# Patient Record
Sex: FEMALE | Race: OTHER | Employment: OTHER | URBAN - METROPOLITAN AREA
[De-identification: names, ages, dates, MRNs, and addresses within clinical notes are randomized per-mention and may not be internally consistent; named-entity substitution may affect disease eponyms.]

---

## 2017-01-18 DIAGNOSIS — G25.81 RLS (RESTLESS LEGS SYNDROME): ICD-10-CM

## 2017-01-18 RX ORDER — TRAZODONE HYDROCHLORIDE 50 MG/1
TABLET ORAL
Qty: 90 TABLET | Refills: 1 | Status: SHIPPED | OUTPATIENT
Start: 2017-01-18 | End: 2017-02-26 | Stop reason: SDUPTHER

## 2017-02-25 DIAGNOSIS — G25.81 RLS (RESTLESS LEGS SYNDROME): ICD-10-CM

## 2017-02-25 DIAGNOSIS — K21.9 GASTROESOPHAGEAL REFLUX DISEASE, ESOPHAGITIS PRESENCE NOT SPECIFIED: ICD-10-CM

## 2017-02-25 RX ORDER — OMEPRAZOLE 20 MG/1
20 CAPSULE, DELAYED RELEASE ORAL DAILY
Qty: 90 CAPSULE | Refills: 1 | Status: SHIPPED | OUTPATIENT
Start: 2017-02-25 | End: 2017-09-19 | Stop reason: SDUPTHER

## 2017-02-25 RX ORDER — GABAPENTIN 100 MG/1
100 CAPSULE ORAL NIGHTLY
Qty: 90 CAPSULE | Refills: 1 | Status: SHIPPED | OUTPATIENT
Start: 2017-02-25 | End: 2017-09-19 | Stop reason: SDUPTHER

## 2017-02-26 DIAGNOSIS — G25.81 RLS (RESTLESS LEGS SYNDROME): ICD-10-CM

## 2017-02-26 RX ORDER — TRAZODONE HYDROCHLORIDE 50 MG/1
50 TABLET ORAL NIGHTLY
Qty: 90 TABLET | Refills: 1 | Status: SHIPPED | OUTPATIENT
Start: 2017-02-26

## 2017-06-07 ENCOUNTER — OFFICE VISIT (OUTPATIENT)
Dept: FAMILY MEDICINE CLINIC | Age: 72
End: 2017-06-07

## 2017-06-07 VITALS
WEIGHT: 214 LBS | SYSTOLIC BLOOD PRESSURE: 136 MMHG | OXYGEN SATURATION: 96 % | TEMPERATURE: 96.6 F | HEIGHT: 66 IN | HEART RATE: 63 BPM | RESPIRATION RATE: 18 BRPM | DIASTOLIC BLOOD PRESSURE: 74 MMHG | BODY MASS INDEX: 34.39 KG/M2

## 2017-06-07 DIAGNOSIS — G25.81 RESTLESS LEG SYNDROME: ICD-10-CM

## 2017-06-07 DIAGNOSIS — Z23 NEED FOR TDAP VACCINATION: ICD-10-CM

## 2017-06-07 DIAGNOSIS — K21.9 GERD WITHOUT ESOPHAGITIS: ICD-10-CM

## 2017-06-07 DIAGNOSIS — L98.9 NON-HEALING SKIN LESION OF NOSE: ICD-10-CM

## 2017-06-07 DIAGNOSIS — M25.552 LEFT HIP PAIN: Primary | ICD-10-CM

## 2017-06-07 PROBLEM — E78.00 HYPERCHOLESTEROLEMIA: Status: ACTIVE | Noted: 2017-06-07

## 2017-06-07 RX ORDER — GABAPENTIN 100 MG/1
CAPSULE ORAL 3 TIMES DAILY
COMMUNITY

## 2017-06-07 RX ORDER — CLONAZEPAM 0.5 MG/1
TABLET ORAL
COMMUNITY

## 2017-06-07 RX ORDER — CYANOCOBALAMIN (VITAMIN B-12) 500 MCG
TABLET ORAL DAILY
COMMUNITY

## 2017-06-07 RX ORDER — TRAMADOL HYDROCHLORIDE 50 MG/1
50 TABLET ORAL
Qty: 20 TAB | Refills: 0 | Status: SHIPPED | OUTPATIENT
Start: 2017-06-07

## 2017-06-07 RX ORDER — OMEPRAZOLE 20 MG/1
20 CAPSULE, DELAYED RELEASE ORAL DAILY
COMMUNITY

## 2017-06-07 RX ORDER — TRAZODONE HYDROCHLORIDE 50 MG/1
TABLET ORAL
COMMUNITY

## 2017-06-07 NOTE — PROGRESS NOTES
Cary Ziegler is a 66 y.o.  female and presents with    Chief Complaint   Patient presents with    Establish Care     Subjective:  Hip Pain  Patient complains of left hip pain. Onset of the symptoms was several months ago. Inciting event: none. Current symptoms include left hip pain radiating into buttock and left knee. Severity = fairly severe. Associated symptoms: none. Aggravating symptoms: going up and down stairs. Patient's overall course: gradually worsening. Patient has had prior hip problems. Previous visits for this problem: yes, last seen 4 years ago by the patient's PCP. Evaluation to date: plain films: abnormal: mild arthritis. Treatment to date: ibuprofen 800 mg twice a day with minimal relief. She does not exercise. She has been diagnosed with restless leg syndrome and uses clonazepam and trazodone as needed for sleep. Patient Active Problem List   Diagnosis Code    Restless leg syndrome G25.81    Hypercholesterolemia E78.00     Patient Active Problem List    Diagnosis Date Noted    Restless leg syndrome 06/07/2017    Hypercholesterolemia 06/07/2017     Current Outpatient Prescriptions   Medication Sig Dispense Refill    clonazePAM (KLONOPIN) 0.5 mg tablet Take  by mouth nightly as needed.  omeprazole (PRILOSEC) 20 mg capsule Take 20 mg by mouth daily.  B.infantis-B.ani-B.long-B.bifi (PROBIOTIC 4X) 10-15 mg TbEC Take  by mouth.  traZODone (DESYREL) 50 mg tablet Take  by mouth nightly.  cholecalciferol (VITAMIN D3) 400 unit tab tablet Take  by mouth daily.  gabapentin (NEURONTIN) 100 mg capsule Take  by mouth three (3) times daily. Allergies   Allergen Reactions    Statins-Hmg-Coa Reductase Inhibitors Myalgia     Past Medical History:   Diagnosis Date    Hypercholesterolemia     Restless leg syndrome      Past Surgical History:   Procedure Laterality Date    HX TONSILLECTOMY  1951     History reviewed. No pertinent family history.   Social History   Substance Use Topics    Smoking status: Never Smoker    Smokeless tobacco: Not on file    Alcohol use Yes      Comment: ocassionally       ROS   General ROS: negative for - chills or fever  Ophthalmic ROS: positive for - uses glasses  ENT ROS: negative for - headaches  Endocrine ROS: negative for - polydipsia/polyuria or temperature intolerance  Respiratory ROS: no cough, shortness of breath, or wheezing  Cardiovascular ROS: no chest pain or dyspnea on exertion  Gastrointestinal ROS: no abdominal pain, change in bowel habits, or black or bloody stools  Genito-Urinary ROS: stress incontinence  Dermatological ROS: negative for - rash or skin lesion changes    All other systems reviewed and are negative. Objective:  Vitals:    06/07/17 1124   BP: 155/70   Pulse: 63   Resp: 18   Temp: 96.6 °F (35.9 °C)   TempSrc: Oral   SpO2: 96%   Weight: 214 lb (97.1 kg)   Height: 5' 6\" (1.676 m)   PainSc:   5   PainLoc: Hip       alert, well appearing, and in no distress, oriented to person, place, and time and obese  Mental status - normal mood, behavior, speech, dress, motor activity, and thought processes  Chest - clear to auscultation, no wheezes, rales or rhonchi, symmetric air entry  Heart - normal rate, regular rhythm, normal S1, S2, no murmurs, rubs, clicks or gallops  Neurological - cranial nerves II through XII intact, motor and sensory grossly normal bilaterally, normal muscle tone, no tremors, strength 5/5, normal gait and station  Musculoskeletal - abnormal exam of left hip with pain with motion external rotation  Extremities - peripheral pulses normal, no pedal edema, no clubbing or cyanosis  Skin - normal coloration and turgor, no rashes, no suspicious skin lesions noted    Assessment/Plan:    1. Left hip pain  Pain management and referral to physical therapy  - REFERRAL TO PHYSICAL THERAPY  - traMADol (ULTRAM) 50 mg tablet; Take 1 Tab by mouth every six (6) hours as needed for Pain.  Max Daily Amount: 200 mg. Dispense: 20 Tab; Refill: 0    2. Restless leg syndrome  Continue current medications which have been effective    3. Need for Tdap vaccination    - TETANUS, DIPHTHERIA TOXOIDS AND ACELLULAR PERTUSSIS VACCINE (TDAP), IN INDIVIDS. >=7, IM    4. Non-healing skin lesion of nose  Concern for malignant neoplasm  - REFERRAL TO DERMATOLOGY    5. GERD without esophagitis  Continue PPI;       I have discussed the diagnosis with the patient and the intended plan as seen in the above orders. The patient has received an after-visit summary and questions were answered concerning future plans. I have discussed medication side effects and warnings with the patient as well. I have reviewed the plan of care with the patient, accepted their input and they are in agreement with the treatment goals. Follow-up Disposition:  Return in about 5 weeks (around 7/12/2017) for hip pain.

## 2017-06-07 NOTE — MR AVS SNAPSHOT
Visit Information Date & Time Provider Department Dept. Phone Encounter #  
 6/7/2017 10:45 AM Salas Trevizo, [facility] [phone] [id number] Follow-up Instructions Return in about 5 weeks (around 7/12/2017) for hip pain. Upcoming Health Maintenance Date Due DTaP/Tdap/Td series (1 - Tdap) 8/17/1965 BREAST CANCER SCRN MAMMOGRAM 8/17/1994 FOBT Q 1 YEAR AGE 50-75 8/17/1994 ZOSTER VACCINE AGE 60> 8/17/2004 GLAUCOMA SCREENING Q2Y 8/17/2009 OSTEOPOROSIS SCREENING (DEXA) 8/17/2009 Pneumococcal 65+ Low/Medium Risk (1 of 2 - PCV13) 8/17/2009 MEDICARE YEARLY EXAM 8/17/2009 INFLUENZA AGE 9 TO ADULT 8/1/2017 Allergies as of 6/7/2017  Review Complete On: 6/7/2017 By: Salas Trevizo MD  
  
 Severity Noted Reaction Type Reactions Statins-hmg-coa Reductase Inhibitors  06/07/2017    Myalgia Current Immunizations  Never Reviewed Name Date Tdap 6/7/2017 Not reviewed this visit You Were Diagnosed With   
  
 Codes Comments Left hip pain    -  Primary ICD-10-CM: G94.769 ICD-9-CM: 719.45 Restless leg syndrome     ICD-10-CM: G25.81 ICD-9-CM: 333.94 Need for Tdap vaccination     ICD-10-CM: S61 ICD-9-CM: V06.1 Non-healing skin lesion of nose     ICD-10-CM: L98.9 ICD-9-CM: 709.9 GERD without esophagitis     ICD-10-CM: K21.9 ICD-9-CM: 530.81 Vitals BP Pulse Temp Resp Height(growth percentile) Weight(growth percentile) 136/74 (BP 1 Location: Right arm, BP Patient Position: Sitting) 63 96.6 °F (35.9 °C) (Oral) 18 5' 6\" (1.676 m) 214 lb (97.1 kg) SpO2 BMI Smoking Status 96% 34.54 kg/m2 Never Smoker Vitals History BMI and BSA Data Body Mass Index Body Surface Area 34.54 kg/m 2 2.13 m 2 Your Updated Medication List  
  
   
This list is accurate as of: 6/7/17 12:12 PM.  Always use your most recent med list.  
  
  
  
  
 cholecalciferol 400 unit Tab tablet Commonly known as:  VITAMIN D3 Take  by mouth daily. clonazePAM 0.5 mg tablet Commonly known as:  Jeremy Jenny Take  by mouth nightly as needed. gabapentin 100 mg capsule Commonly known as:  NEURONTIN Take  by mouth three (3) times daily. omeprazole 20 mg capsule Commonly known as:  PRILOSEC Take 20 mg by mouth daily. PROBIOTIC 4X 10-15 mg Tbec Generic drug:  B.infantis-B.ani-B.long-B.bifi Take  by mouth. traMADol 50 mg tablet Commonly known as:  ULTRAM  
Take 1 Tab by mouth every six (6) hours as needed for Pain. Max Daily Amount: 200 mg.  
  
 traZODone 50 mg tablet Commonly known as:  Luellen Loft Take  by mouth nightly. Prescriptions Printed Refills  
 traMADol (ULTRAM) 50 mg tablet 0 Sig: Take 1 Tab by mouth every six (6) hours as needed for Pain. Max Daily Amount: 200 mg. Class: Print Route: Oral  
  
We Performed the Following REFERRAL TO DERMATOLOGY [REF19 Custom] Comments:  
 Please evaluate 65 y/o female patient for non healing lesion on nose. REFERRAL TO PHYSICAL THERAPY [LVE16 Custom] Comments:  
 Please evaluate 65 y/o female patient for left hip pain. TETANUS, DIPHTHERIA TOXOIDS AND ACELLULAR PERTUSSIS VACCINE (TDAP), IN INDIVIDS. >=7, IM [id number] CPT(R)] Follow-up Instructions Return in about 5 weeks (around 7/12/2017) for hip pain. Referral Information Referral ID Referred By Referred To  
  
 [id number] ROBYN [address] [place], [address] [place] [address] Road Phone: [phone] Fax: [phone] Visits Status Start Date End Date 1 New Request 6/7/17 6/7/18 If your referral has a status of pending review or denied, additional information will be sent to support the outcome of this decision. Referral ID Referred By Referred To  
 [id number] LIZZETTE CARLSON IN [facility]. [address] [place] [address] [address] [place], [address] [facility] Phone: [phone] Visits Status Start Date End Date 1 New Request 6/7/17 6/7/18 If your referral has a status of pending review or denied, additional information will be sent to support the outcome of this decision. Introducing \[facility] [place] [facility]\"" & HEALTH SERVICES! Claribel Cheung introduces [employer] patient portal. Now you can access parts of your medical record, email your doctor's office, and request medication refills online. 1. In your internet browser, go to [email or web address] 2. Click on the First Time User? Click Here link in the Sign In box. You will see the New Member Sign Up page. 3. Enter your [employer] Access Code exactly as it appears below. You will not need to use this code after youve completed the sign-up process. If you do not sign up before the expiration date, you must request a new code. · [employer] Access Code: [email or web address] Expires: 9/5/2017 11:11 AM 
 
4. Enter the last four digits of your Social Security Number (xxxx) and Date of Birth (mm/dd/yyyy) as indicated and click Submit. You will be taken to the next sign-up page. 5. Create a [employer] ID. This will be your [employer] login ID and cannot be changed, so think of one that is secure and easy to remember. 6. Create a [employer] password. You can change your password at any time. 7. Enter your Password Reset Question and Answer. This can be used at a later time if you forget your password. 8. Enter your e-mail address. You will receive e-mail notification when new information is available in [address] 9. Click Sign Up. You can now view and download portions of your medical record. 10. Click the Download Summary menu link to download a portable copy of your medical information. If you have questions, please visit the Frequently Asked Questions section of the [employer] website. Remember, [employer] is NOT to be used for urgent needs. For medical emergencies, dial 911. Now available from your iPhone and Android! Please provide this summary of care documentation to your next provider. If you have any questions after today's visit, please call [phone].

## 2017-06-07 NOTE — PROGRESS NOTES
1. Have you been to the ER, urgent care clinic since your last visit? Hospitalized since your last visit? NO    2. Have you seen or consulted any other health care providers outside of the [facility] since your last visit? Include any pap smears or colon screening.  NO

## 2017-06-27 ENCOUNTER — HOSPITAL ENCOUNTER (OUTPATIENT)
Dept: PHYSICAL THERAPY | Age: 72
Discharge: HOME | End: 2017-06-27
Payer: MEDICARE

## 2017-06-27 PROCEDURE — 97161 PT EVAL LOW COMPLEX 20 MIN: CPT | Performed by: PHYSICAL THERAPIST

## 2017-06-27 PROCEDURE — G8978 MOBILITY CURRENT STATUS: HCPCS | Performed by: PHYSICAL THERAPIST

## 2017-06-27 PROCEDURE — G8979 MOBILITY GOAL STATUS: HCPCS | Performed by: PHYSICAL THERAPIST

## 2017-06-27 PROCEDURE — 97110 THERAPEUTIC EXERCISES: CPT | Performed by: PHYSICAL THERAPIST

## 2017-06-27 NOTE — PROGRESS NOTES
[facility] PH[facility] - DAILY TREATMENT NOTE    Patient Name: Nidia Hendrix        Date: 2017  : 1945   yes Patient  Verified  Visit #:     Insurance: Payor: Rena Houston / Plan: [place] MEDICARE PART A & B / Product Type: Medicare /      In time: 12:25 Out time: 1:05   Total Treatment Time: 40     Medicare Time Tracking (below)   Total Timed Codes (min):  10 1:1 Treatment Time:  10     TREATMENT AREA =  Left hip pain [M25.552]    SUBJECTIVE  Pain Level (on 0 to 10 scale):  ie  / 10   Medication Changes/New allergies or changes in medical history, any new surgeries or procedures?    no  If yes, update Summary List   Subjective Functional Status/Changes:  []  No changes reported     See ie          OBJECTIVE      10 min Therapeutic Exercise:  [x]  See flow sheet   Rationale:      increase ROM and increase strength to improve the patients ability to complete adls            min Patient Education:  yes  Reviewed HEP   []  Progressed/Changed HEP based on: Other Objective/Functional Measures:    Demo hep without any increase in pain  See ie     Post Treatment Pain Level (on 0 to 10) scale:   ie  / 10     ASSESSMENT  Assessment/Changes in Function:     See ie     []  See Progress Note/Recertification   Patient will continue to benefit from skilled PT services to modify and progress therapeutic interventions, address functional mobility deficits, address ROM deficits, address strength deficits, analyze and address soft tissue restrictions, analyze and cue movement patterns, analyze and modify body mechanics/ergonomics, assess and modify postural abnormalities and instruct in home and community integration to attain remaining goals.    Progress toward goals / Updated goals:    See ie     PLAN  []  Upgrade activities as tolerated yes Continue plan of care   []  Discharge due to :    []  Other:      Therapist: Farida Diaz PT    Date: 2017 Time: 1:13 PM     Future Appointments  Date Time Provider Patricia Barrera   6/28/2017 8:00 AM Maricarmen Skains, PT [facility]   7/3/2017 11:00 AM Maricarmen Skains, PT [facility]   7/5/2017 9:00 AM Maricarmen Skains, PT [facility]   7/7/2017 8:30 AM Maricarmen Skains, PT [facility]   7/10/2017 2:30 PM Maricarmen Skains, PT [facility]   7/12/2017 10:00 AM Maricarmen Skains, PT [facility]   7/14/2017 10:30 AM Maricarmen Skains, PT [facility]   7/17/2017 8:30 AM Maricarmen Skains, PT [facility]   7/19/2017 1:00 PM Maricarmen Skains, PT [facility]   7/21/2017 11:00 AM Maricarmen Skains, PT [facility]   7/24/2017 10:30 AM Maricarmen Skains, PT [facility]   7/26/2017 10:30 AM Maricarmen Skains, PT [facility]   7/28/2017 10:30 AM Maricarmen Skains, PT [facility]

## 2017-06-27 NOTE — PROGRESS NOTES
[facility] PHYSICAL [facility]ERAPY   [place] [address],[facility], [address] - Phone: [phone]  Fax: [phone] / [address] [facility]  Patient Name: Isaías Rojas :    Medical   Diagnosis: L hip pain  Treatment Diagnosis: Left hip pain [M25.552]   Onset Date: chronic     Referral Source: Reinaldo Larose MD Start of Care [facility]): 2017   Prior Hospitalization: See medical history Provider #: [id number]   Prior Level of Function: Previous low back pain limiting lifting/bending   Comorbidities: LBP   Medications: Verified on Patient Summary List   The Plan of Care and following information is based on the information from the initial evaluation.   ===========================================================================================  Assessment / key information:  66 F arrives to clinic c/o L posterior lateral pain on insidious onset. Pain 2-9/10 based on activity level (increased with standing/stairs) and is alleviated with rest. Denies red flags, formal imaging or previous treatment. Objective findings: ls arom wnl all directions but aberrant movement return from flexion, -slump, slr, L hip arom flex 88, er 4, ir neutral, ext 8, abd 14, strength reduced in all directions with er/flex P!, +star, faddir, terese test and prone er. ttp throughout glute med, piriformis and iliopsoas.  Will attempt PT to address problem list below in order to restore pt overall function.   ===========================================================================================  Eval Complexity: History MEDIUM  Complexity : 1-2 comorbidities / personal factors will impact the outcome/ POC ;  Examination  MEDIUM Complexity : 3 Standardized tests and measures addressing body structure, function, activity limitation and / or participation in recreation ; Presentation LOW Complexity : Stable, uncomplicated ;  Decision Making MEDIUM Complexity : FOTO score of 26-74; Overall Complexity LOW   Problem List: pain affecting function, decrease ROM, decrease strength, impaired gait/ balance, decrease ADL/ functional abilitiies, decrease activity tolerance, decrease flexibility/ joint mobility and decrease transfer abilities   Treatment Plan may include any combination of the following: Therapeutic exercise, Therapeutic activities, Neuromuscular re-education, Physical agent/modality, Gait/balance training, Manual therapy, Patient education, Self Care training, Functional mobility training, Home safety training and Stair training  Patient / Family readiness to learn indicated by: asking questions, trying to perform skills and interest  Persons(s) to be included in education: patient (P)  Barriers to Learning/Limitations: None  Measures taken:    Patient Goal (s): Decrease pain   Patient self reported health status: good  Rehabilitation Potential: good   Short Term Goals: To be accomplished in  2  weeks:  1. Pt will be compliant with hep  2. Pt will note daily max pain </=7/10 in order to increase participation in [address]: To be accomplished in  4  weeks:  1. Pt will increase FOTO by 30 points in order to show functional improvement  2. Pt will increase L hip arom wnl all directions to A with adls  3. Pt will note daily max pain </=3/10 in order to increase participation in adls  Frequency / Duration:   Patient to be seen  2-3  times per week for 4  weeks:  Patient / Caregiver education and instruction: self care, activity modification and exercises  G-Codes (GP): Jose Ramon Zapien .Mobility [id number] Current  CL= 60-79%   Goal  CK= 40-59%.   The severity rating is based on the FOTO Score    Therapist Signature: Gage Cantor DPT [facility] Date: 0/46/0109   Certification Period: 6/27/17-9/25/17 Time: 1:14 PM   ===========================================================================================  I certify that the above Physical Therapy Services are being furnished while the patient is under my care. I agree with the treatment plan and certify that this therapy is necessary. Physician Signature:        Date:       Time:     Please sign and return to [facility]. or you may fax the signed copy to [phone]. Thank you.

## 2017-06-28 ENCOUNTER — HOSPITAL ENCOUNTER (OUTPATIENT)
Dept: PHYSICAL THERAPY | Age: 72
Discharge: HOME | End: 2017-06-28
Payer: MEDICARE

## 2017-06-28 PROCEDURE — 97110 THERAPEUTIC EXERCISES: CPT | Performed by: PHYSICAL THERAPIST

## 2017-06-28 PROCEDURE — 97140 MANUAL THERAPY 1/> REGIONS: CPT | Performed by: PHYSICAL THERAPIST

## 2017-06-28 NOTE — PROGRESS NOTES
[facility] PHYSICAL THERAP[facility] - DAILY TREATMENT NOTE    Patient Name: Sade Reid        Date: 2017  : 1945   yes Patient  Verified  Visit #:     Insurance: Payor: Mary Kate Torre / Plan: [place] MEDICARE PART A & B / Product Type: Medicare /      In time: 7:55 Out time: 8:55   Total Treatment Time: 60     Medicare Time Tracking (below)   Total Timed Codes (min):  50 1:1 Treatment Time:  50     TREATMENT AREA =  Left hip pain [M25.552]    SUBJECTIVE  Pain Level (on 0 to 10 scale):  4  / 10   Medication Changes/New allergies or changes in medical history, any new surgeries or procedures?    no  If yes, update Summary List   Subjective Functional Status/Changes:  []  No changes reported     No change since yesterday evaluation - I did the exercises one more time and they felt pretty good            OBJECTIVE  Modalities Rationale:     decrease inflammation, decrease pain and increase tissue extensibility to improve patient's ability to complete adls   min [] Estim, type/location:                                      []  att     []  unatt     []  w/US     []  w/ice    []  w/heat    min []  Mechanical Traction: type/lbs                   []  pro   []  sup   []  int   []  cont    []  before manual    []  after manual    min []  Ultrasound, settings/location:      min []  Iontophoresis w/ dexamethasone, location:                                               []  take home patch       []  in clinic   10 min [x]  Ice     []  Heat    location/position:  To left hip in R sidelying     min []  Vasopneumatic Device, press/temp:     min []  Other:    [x] Skin assessment post-treatment (if applicable):    [x]  intact    []  redness- no adverse reaction     []redness  adverse reaction:        30 min Therapeutic Exercise:  [x]  See flow sheet   Rationale:      increase ROM and increase strength to improve the patients ability to complete adls     20 min Manual Therapy: Sacral flexion, rr mobs, mfr lumbosacral junction, L sacral border, L hip ir/er stretch, iliacus and rf stretch   Rationale:      decrease pain, increase ROM, increase tissue extensibility and decrease trigger points to improve patient's ability to complete adls         min Patient Education:  yes  Reviewed HEP   []  Progressed/Changed HEP based on: Other Objective/Functional Measures:  Decreased hip/pelvis disassociation during hip prom with increase ls motion to achieve range  Complete te as per flow sheet without c/o      Post Treatment Pain Level (on 0 to 10) scale:   0  / 10     ASSESSMENT  Assessment/Changes in Function:     Complete resolution of pain at end of session     []  See Progress Note/Recertification   Patient will continue to benefit from skilled PT services to modify and progress therapeutic interventions, address functional mobility deficits, address ROM deficits, address strength deficits, analyze and address soft tissue restrictions, analyze and cue movement patterns, analyze and modify body mechanics/ergonomics, assess and modify postural abnormalities and instruct in home and community integration to attain remaining goals.    Progress toward goals / Updated goals:    Compliant with initial hep     PLAN  []  Upgrade activities as tolerated yes Continue plan of care   []  Discharge due to :    []  Other:      Therapist: Yael Farley PT    Date: 6/28/2017 Time: 7:58 AM     Future Appointments  Date Time Provider Patricia Barrera   6/28/2017 8:00 AM Yael Farley PT [facility]   7/3/2017 11:00 AM Yale Farley PT [facility]   7/5/2017 9:00 AM Yael Farley PT [facility]   7/7/2017 8:30 AM Yael Farley PT [facility]   7/10/2017 2:30 PM Yael Farley PT [facility]   7/12/2017 10:00 AM Yael Farley PT [facility]   7/14/2017 10:30 AM Yael Farley PT [facility]   7/17/2017 8:30 AM Yael Farley PT [facility]   7/19/2017 1:00 PM Yael Farley PT [facility]   7/21/2017 11:00 AM Yael Farley PT [facility]   7/24/2017 10:30 AM Kt Wooten, PT [facility]   7/26/2017 10:30 AM tK Wooten PT [facility]   7/28/2017 10:30 AM Kt Wooten, PT [facility] [facility]

## 2017-07-03 ENCOUNTER — APPOINTMENT (OUTPATIENT)
Dept: PHYSICAL THERAPY | Age: 72
End: 2017-07-03
Payer: MEDICARE

## 2017-07-05 ENCOUNTER — HOSPITAL ENCOUNTER (OUTPATIENT)
Dept: PHYSICAL THERAPY | Age: 72
Discharge: HOME | End: 2017-07-05
Payer: MEDICARE

## 2017-07-05 PROCEDURE — 97140 MANUAL THERAPY 1/> REGIONS: CPT | Performed by: PHYSICAL THERAPIST

## 2017-07-05 PROCEDURE — 97110 THERAPEUTIC EXERCISES: CPT | Performed by: PHYSICAL THERAPIST

## 2017-07-05 NOTE — PROGRESS NOTES
[facility] PHYSICAL THERAPY - DAILY TREATMENT NOTE    Patient Name: Travis Morales        Date: 2017  : 1945   yes Patient  Verified  Visit #:   3   of   12  Insurance: Payor: Milka Maddox / Plan: [place] MED[place] A & B / Product Type: Medicare /      In time: 8:50 Out time: 9:48   Total Treatment Time: 54     Medicare Time Tracking (below)   Total Timed Codes (min):  45 1:1 Treatment Time:  45     TREATMENT AREA =  Left hip pain [M25.552]    SUBJECTIVE  Pain Level (on 0 to 10 scale):  0/ 10   Medication Changes/New allergies or changes in medical history, any new surgeries or procedures?    no  If yes, update Summary List   Subjective Functional Status/Changes:  []  No changes reported     I feel so much better than last time. Over the weekend I was able to stand and do dishes and get in/out of car without any increase in pain        OBJECTIVE  Modalities Rationale:     decrease inflammation, decrease pain and increase tissue extensibility to improve patient's ability to complete adls   min [] Estim, type/location:                                      []  att     []  unatt     []  w/US     []  w/ice    []  w/heat    min []  Mechanical Traction: type/lbs                   []  pro   []  sup   []  int   []  cont    []  before manual    []  after manual    min []  Ultrasound, settings/location:      min []  Iontophoresis w/ dexamethasone, location:                                               []  take home patch       []  in clinic   10 min [x]  Ice     []  Heat    location/position:  To left hip in R sidelying     min []  Vasopneumatic Device, press/temp:     min []  Other:    [x] Skin assessment post-treatment (if applicable):    [x]  intact    []  redness- no adverse reaction     []redness  adverse reaction:        30 min Therapeutic Exercise:  [x]  See flow sheet   Rationale:      increase ROM and increase strength to improve the patients ability to complete adls     15 min Manual Therapy: Sacral flexion, rr mobs, mfr lumbosacral junction, L sacral border, L hip ir/er stretch, iliacus and rf stretch   Rationale:      decrease pain, increase ROM, increase tissue extensibility and decrease trigger points to improve patient's ability to complete adls         min Patient Education:  yes  Reviewed HEP   []  Progressed/Changed HEP based on: Other Objective/Functional Measures:  Significant reduction in piriformis and GM tightness compared to previous session  Improved form with te with decreased ls rotation compensation      Post Treatment Pain Level (on 0 to 10) scale:   0  / 10     ASSESSMENT  Assessment/Changes in Function:     Increased participation in adls including standing/walking for longer duration since last session      []  See Progress Note/Recertification   Patient will continue to benefit from skilled PT services to modify and progress therapeutic interventions, address functional mobility deficits, address ROM deficits, address strength deficits, analyze and address soft tissue restrictions, analyze and cue movement patterns, analyze and modify body mechanics/ergonomics, assess and modify postural abnormalities and instruct in home and community integration to attain remaining goals.    Progress toward goals / Updated goals:    Steady progress towards pain reduction     PLAN  []  Upgrade activities as tolerated yes Continue plan of care   []  Discharge due to :    []  Other:      Therapist: Mau Mccormack PT    Date: 7/5/2017 Time: 7:58 AM     Future Appointments  Date Time Provider Patricia Barrera   7/5/2017 9:00 AM Mau Mccormack PT [facility]   7/7/2017 8:30 AM Mau Mccormack, PT [facility]   7/10/2017 2:30 PM Mau Mccormack PT [facility]   7/12/2017 10:00 AM Mau Mccormack PT [facility]   7/14/2017 10:30 AM Mau Mccormack, PT [facility]   7/17/2017 8:30 AM Mau Mccormack PT [facility]   7/19/2017 1:00 PM Mau Mccormack PT [facility]   7/21/2017 11:00 AM Mau Mccormack, PT [facility] 7/24/2017 10:30 AM Kt Wooten, PT [facility]   7/26/2017 10:30 AM [facility]   7/28/2017 10:30 AM Kt Wooten, PT [address] [facility]

## 2017-07-07 ENCOUNTER — HOSPITAL ENCOUNTER (OUTPATIENT)
Dept: PHYSICAL THERAPY | Age: 72
Discharge: HOME | End: 2017-07-07
Payer: MEDICARE

## 2017-07-07 PROCEDURE — 97110 THERAPEUTIC EXERCISES: CPT | Performed by: PHYSICAL THERAPIST

## 2017-07-07 PROCEDURE — 97140 MANUAL THERAPY 1/> REGIONS: CPT | Performed by: PHYSICAL THERAPIST

## 2017-07-07 NOTE — PROGRESS NOTES
[facility] PHYSICAL THERAPY - DAILY TREATMENT NOTE    Patient Name: Sissy Coyne        Date: 2017  : 1945   yes Patient  Verified  Visit #:     Insurance: Payor: Madeline Lloyd / Plan: [place] MEDICARE PART A & B / Product Type: Medicare /      In time: 8:24 Out time: 9:25   Total Treatment Time: 60     Medicare Time Tracking (below)   Total Timed Codes (min):  1:1 Treatment Time:      TREATMENT AREA =  Left hip pain [M25.552]    SUBJECTIVE  Pain Level (on 0 to 10 scale):  2 / 10   Medication Changes/New allergies or changes in medical history, any new surgeries or procedures?    no  If yes, update Summary List   Subjective Functional Status/Changes:  []  No changes reported     A little sore after last session but I really feel like it is just the muscle I have not used for a long time. It is not real pain or anything. OBJECTIVE  Modalities Rationale:     decrease inflammation, decrease pain and increase tissue extensibility to improve patient's ability to complete adls   min [] Estim, type/location:                                      []  att     []  unatt     []  w/US     []  w/ice    []  w/heat    min []  Mechanical Traction: type/lbs                   []  pro   []  sup   []  int   []  cont    []  before manual    []  after manual    min []  Ultrasound, settings/location:      min []  Iontophoresis w/ dexamethasone, location:                                               []  take home patch       []  in clinic   10 min [x]  Ice     []  Heat    location/position:  To left hip in R sidelying     min []  Vasopneumatic Device, press/temp:     min []  Other:    [x] Skin assessment post-treatment (if applicable):    [x]  intact    []  redness- no adverse reaction     []redness  adverse reaction:        35 min Therapeutic Exercise:  [x]  See flow sheet   Rationale:      increase ROM and increase strength to improve the patients ability to complete adls     15 min Manual Therapy: Sacral flexion, rr mobs, mfr lumbosacral junction, L sacral border, L hip ir/er stretch, iliacus and rf stretch   Rationale:      decrease pain, increase ROM, increase tissue extensibility and decrease trigger points to improve patient's ability to complete adls         min Patient Education:  yes  Reviewed HEP   []  Progressed/Changed HEP based on: Other Objective/Functional Measures:  Resumed te as per flow sheet (pt agreed to progression and expressed understanding of DOMS). Able to complete increased reps without c/o pain    Post Treatment Pain Level (on 0 to 10) scale:   0  / 10     ASSESSMENT  Assessment/Changes in Function:     Pt reports ability to walk in/out of clinic without any increase in L buttock pain      []  See Progress Note/Recertification   Patient will continue to benefit from skilled PT services to modify and progress therapeutic interventions, address functional mobility deficits, address ROM deficits, address strength deficits, analyze and address soft tissue restrictions, analyze and cue movement patterns, analyze and modify body mechanics/ergonomics, assess and modify postural abnormalities and instruct in home and community integration to attain remaining goals.    Progress toward goals / Updated goals:    Steady progress towards pain reduction - increased hep will again assess compliance - stg #2 achieved     PLAN  []  Upgrade activities as tolerated yes Continue plan of care   []  Discharge due to :    []  Other:      Therapist: Cordell Lerner PT    Date: 7/7/2017 Time: 7:58 AM     Future Appointments  Date Time Provider Patricia Barrera   7/7/2017 8:30 AM Cordell Lerner PT [facility]   7/10/2017 2:30 PM Cordell Lerner PT [facility]   7/12/2017 10:00 AM Cordell Lerner PT [facility]   7/14/2017 10:30 AM Cordell Lerner PT [facility]   7/17/2017 8:30 AM Cordell Lerner PT [facility]   7/19/2017 1:00 PM Cordell Lerner PT [facility]   7/21/2017 11:00 AM Cordell Lerner, PT [facility] 7/24/2017 10:30 AM Gail Villafana, PT [address]   7/26/2017 10:30 AM Gail Villafana, IM[facility] [place] [address]   7/28/2017 10:30 AM Gail Villafana, PT [address]

## 2017-07-10 ENCOUNTER — HOSPITAL ENCOUNTER (OUTPATIENT)
Dept: PHYSICAL THERAPY | Age: 72
Discharge: HOME | End: 2017-07-10
Payer: MEDICARE

## 2017-07-10 PROCEDURE — 97140 MANUAL THERAPY 1/> REGIONS: CPT | Performed by: PHYSICAL THERAPIST

## 2017-07-10 PROCEDURE — 97110 THERAPEUTIC EXERCISES: CPT | Performed by: PHYSICAL THERAPIST

## 2017-07-10 NOTE — PROGRESS NOTES
[facility] PHYSICAL THERAPY - DAILY TREATMENT NOTE    Patient Name: Haydee Haskins        Date: 7/10/2017  : 1945   yes Patient  Verified  Visit #:     Insurance: Payor: Budd Pore / Plan: [place] MEDICARE PART A & B / Product Type: Medicare /      In time: 2:30 Out time: 3:30   Total Treatment Time: 55     Medicare Time Tracking (below)   Total Timed Codes (min): 55 1:1 Treatment Time: 25     TREATMENT AREA =  Left hip pain [M25.552]    SUBJECTIVE  Pain Level (on 0 to 10 scale):  0 / 10   Medication Changes/New allergies or changes in medical history, any new surgeries or procedures?    no  If yes, update Summary List   Subjective Functional Status/Changes:  []  No changes reported     I have had 0/10 pain since last session. I am feeling really good in fact the only thing that was holding me up over the weekend was my knees       OBJECTIVE      40 min Therapeutic Exercise:  [x]  See flow sheet   Rationale:      increase ROM and increase strength to improve the patients ability to complete adls     15 min Manual Therapy: Sacral flexion, rr mobs, mfr lumbosacral junction, L sacral border, L hip ir/er stretch, iliacus and rf stretch   Rationale:      decrease pain, increase ROM, increase tissue extensibility and decrease trigger points to improve patient's ability to complete adls         min Patient Education:  yes  Reviewed HEP   []  Progressed/Changed HEP based on:        Other Objective/Functional Measures:  1:1 230-255  Significant reduction in soft tissue restrictions in L gluteal region  Progressed te as per flow sheet - denied pain at end so held on ice   Post Treatment Pain Level (on 0 to 10) scale:   0  / 10     ASSESSMENT  Assessment/Changes in Function:   Reports able to ambulate on uneven surface (grass) and negotiate stairs without any hip or back pain       []  See Progress Note/Recertification   Patient will continue to benefit from skilled PT services to modify and progress therapeutic interventions, address functional mobility deficits, address ROM deficits, address strength deficits, analyze and address soft tissue restrictions, analyze and cue movement patterns, analyze and modify body mechanics/ergonomics, assess and modify postural abnormalities and instruct in home and community integration to attain remaining goals. Progress toward goals / Updated goals:     Will perform FOTO next session In order to assess progress towards goal     PLAN  []  Upgrade activities as tolerated yes Continue plan of care   []  Discharge due to :    []  Other:      Therapist: Yael Farley PT    Date: 7/10/2017 Time: 7:58 AM     Future Appointments  Date Time Provider Patricia Barrera   7/10/2017 2:30 PM Yale Farley PT [facility]   7/12/2017 10:00 AM Yael Farley PT [facility]   7/14/2017 10:30 AM Yael Farley PT [facility]   7/17/2017 8:30 AM Yael Farley PT [facility]   7/19/2017 1:00 PM Yael Farley PT [facility]   7/21/2017 11:00 AM Yael Farley PT [facility]   7/24/2017 10:30 AM Yael Farley PT [facility]   7/26/2017 10:30 AM Yael Farley PT [facility]   7/28/2017 10:30 AM Yael Farley, PT [facility]

## 2017-07-12 ENCOUNTER — HOSPITAL ENCOUNTER (OUTPATIENT)
Dept: PHYSICAL THERAPY | Age: 72
Discharge: HOME | End: 2017-07-12
Payer: MEDICARE

## 2017-07-12 PROCEDURE — 97140 MANUAL THERAPY 1/> REGIONS: CPT | Performed by: PHYSICAL THERAPIST

## 2017-07-12 NOTE — PROGRESS NOTES
[facility] PHYSICAL THERAPY - DAILY TREATMENT NOTE    Patient Name: Jet Mittal        Date: 2017  : 1945   yes Patient  Verified  Visit #:     Insurance: Payor: Renny Frankel / Plan: [place] MEDICARE PART A & B / Product Type: Medicare /      In time: 10:35 Out time: 11:45   Total Treatment Time: 70     Medicare Time Tracking (below)   Total Timed Codes (min): 60 1:1 Treatment Time: 20     TREATMENT AREA =  Left hip pain [M25.552]    SUBJECTIVE  Pain Level (on 0 to 10 scale):  3 / 10   Medication Changes/New allergies or changes in medical history, any new surgeries or procedures?    no  If yes, update Summary List   Subjective Functional Status/Changes:  []  No changes reported     I was sore in my low back after the last session.  It was hard for me to sleep because of it       OBJECTIVE  Modalities Rationale:     decrease inflammation, decrease pain and increase tissue extensibility to improve patient's ability to complete adls                min [] Estim, type/location:                                       []  att     []  unatt     []  w/US     []  w/ice    []  w/heat     min []  Mechanical Traction: type/lbs                    []  pro   []  sup   []  int   []  cont    []  before manual    []  after manual     min []  Ultrasound, settings/location:        min []  Iontophoresis w/ dexamethasone, location:                                                []  take home patch       []  in clinic   10 min [x]  Ice     []  Heat    location/position: Prone with pillow under hips      min []  Vasopneumatic Device, press/temp:       min []  Other:     [x] Skin assessment post-treatment (if applicable):    [x]  intact    []  redness- no adverse reaction     []redness  adverse reaction:          40 min Therapeutic Exercise:  [x]  See flow sheet   Rationale:      increase ROM and increase strength to improve the patients ability to complete adls     20 min Manual Therapy: Sacral flexion, rr mobs, mfr lumbosacral junction, L sacral border, L hip ir/er stretch, iliacus and rf stretch, stm ls paraspinals, glutes, piri   Rationale:      decrease pain, increase ROM, increase tissue extensibility and decrease trigger points to improve patient's ability to complete adls         min Patient Education:  yes  Reviewed HEP   []  Progressed/Changed HEP based on: Other Objective/Functional Measures:  1:1 mt only - increased paraspinal tightness as well as increased pain along R sacral border  Returned to 1/2 prone ext and able to complete without any increase in pain    Post Treatment Pain Level (on 0 to 10) scale:   0  / 10     ASSESSMENT  Assessment/Changes in Function:     Noted reduction in sx at end of the session. []  See Progress Note/Recertification   Patient will continue to benefit from skilled PT services to modify and progress therapeutic interventions, address functional mobility deficits, address ROM deficits, address strength deficits, analyze and address soft tissue restrictions, analyze and cue movement patterns, analyze and modify body mechanics/ergonomics, assess and modify postural abnormalities and instruct in home and community integration to attain remaining goals. Progress toward goals / Updated goals:    Held on functional goals due to increase in pain reported from previous session and od not feel like it is an accurate assessment of current function. Will attempt next time.       PLAN  []  Upgrade activities as tolerated yes Continue plan of care   []  Discharge due to :    []  Other:      Therapist: Yael Farley PT    Date: 7/12/2017 Time: 7:58 AM     Future Appointments  Date Time Provider Patricia Barrera   7/14/2017 10:30 AM Yael Farley PT [facility]   7/17/2017 8:30 AM Yael Farley PT [facility]   7/19/2017 1:00 PM Yael Farley PT [facility]   7/21/2017 11:00 AM Yael Farley PT [facility]   7/24/2017 10:30 AM Yael Farley PT [facility]   7/26/2017 10:30 AM Charan Heller, PT [facility]   7/28/2017 10:30 AM Charan Heller, PT [facility]   7/31/2017 2:00 PM Charan Heller, PT [facility]

## 2017-07-14 ENCOUNTER — HOSPITAL ENCOUNTER (OUTPATIENT)
Dept: PHYSICAL THERAPY | Age: 72
Discharge: HOME | End: 2017-07-14
Payer: MEDICARE

## 2017-07-14 PROCEDURE — 97110 THERAPEUTIC EXERCISES: CPT | Performed by: PHYSICAL THERAPIST

## 2017-07-14 PROCEDURE — 97140 MANUAL THERAPY 1/> REGIONS: CPT | Performed by: PHYSICAL THERAPIST

## 2017-07-14 NOTE — PROGRESS NOTES
[facility] PHYSICAL THERAPY - DAILY TREATMENT NOTE    Patient Name: Duane Copper        Date: 2017  : 1945   yes Patient  Verified  Visit #:     Insurance: Payor: Honey Everett / Plan: [place] MEDICARE PART A & B / Product Type: Medicare /      In time: 10:18 Out time: 11:23   Total Treatment Time: 72     Medicare Time Tracking (below)   Total Timed Codes (min): 65 1:1 Treatment Time: 42     TREATMENT AREA =  Left hip pain [M25.552]    SUBJECTIVE  Pain Level (on 0 to 10 scale):  1 / 10   Medication Changes/New allergies or changes in medical history, any new surgeries or procedures?    no  If yes, update Summary List   Subjective Functional Status/Changes:  []  No changes reported   My back feels much better than it did the last time I was in. It is not as tight nd has not kept me up at night. OBJECTIVE        43 min Therapeutic Exercise:  [x]  See flow sheet   Rationale:      increase ROM and increase strength to improve the patients ability to complete adls     22 min Manual Therapy: Sacral flexion, rr mobs, mfr lumbosacral junction, L sacral border, L hip ir/er stretch, iliacus and rf stretch, stm ls paraspinals, glutes, piri   Rationale:      decrease pain, increase ROM, increase tissue extensibility and decrease trigger points to improve patient's ability to complete adls         min Patient Education:  yes  Reviewed HEP   []  Progressed/Changed HEP based on:        Other Objective/Functional Measures:  1:1 10:18-11:00  Significant reduction in paraspinal tightness compared to previous session   Able to progress strengthening te without any increase sx   GROC +6   Post Treatment Pain Level (on 0 to 10) scale:   0  / 10     ASSESSMENT  Assessment/Changes in Function:   Continues to have increased participation in standing/walking duration without progressive posterior lateral hip pain that brought her to PT     []  See Progress Note/Recertification   Patient will continue to benefit from skilled PT services to modify and progress therapeutic interventions, address functional mobility deficits, address ROM deficits, address strength deficits, analyze and address soft tissue restrictions, analyze and cue movement patterns, analyze and modify body mechanics/ergonomics, assess and modify postural abnormalities and instruct in home and community integration to attain remaining goals.    Progress toward goals / Updated goals:  FOTO: increased 28 points - progress towards goal      PLAN  []  Upgrade activities as tolerated yes Continue plan of care   []  Discharge due to :    []  Other:      Therapist: Samuel Fischer PT    Date: 7/14/2017 Time: 7:58 AM     Future Appointments  Date Time Provider Patricia Barrera   7/14/2017 10:30 AM Samuel Fischer PT [facility]   7/17/2017 8:30 AM Samuel Fischer, PT [facility]   7/19/2017 1:00 PM Samuel Fischer, PT [facility]   7/21/2017 11:00 AM Samuel Fischer, PT [facility]   7/24/2017 10:30 AM Samuel Fischer, PT [facility]   7/26/2017 10:30 AM Samuel Fischer, PT [facility]   7/28/2017 10:30 AM Samuel Fischer, PT [facility]   7/31/2017 2:00 PM Samuel Fischer, PT [facility]

## 2017-07-17 ENCOUNTER — HOSPITAL ENCOUNTER (OUTPATIENT)
Dept: PHYSICAL THERAPY | Age: 72
Discharge: HOME | End: 2017-07-17
Payer: MEDICARE

## 2017-07-17 PROCEDURE — 97110 THERAPEUTIC EXERCISES: CPT | Performed by: PHYSICAL THERAPIST

## 2017-07-17 PROCEDURE — 97140 MANUAL THERAPY 1/> REGIONS: CPT | Performed by: PHYSICAL THERAPIST

## 2017-07-17 NOTE — PROGRESS NOTES
[facility] PHYSICAL THERAPY - DAILY TREATMENT NOTE    Patient Name: Isaías Rojas        Date: 2017  : 1945   yes Patient  Verified  Visit #:     Insurance: Payor: Fernando Solitario / Plan: [place] MEDICARE PART A & B / Product Type: Medicare /      In time: 8:20 Out time: 9:25   Total Treatment Time: 60     Medicare Time Tracking (below)   Total Timed Codes (min): 60 1:1 Treatment Time: 35     TREATMENT AREA =  Left hip pain [M25.552]    SUBJECTIVE  Pain Level (on 0 to 10 scale):  1 / 10   Medication Changes/New allergies or changes in medical history, any new surgeries or procedures?    no  If yes, update Summary List   Subjective Functional Status/Changes:  []  No changes reported   My back feels much better than it did the last time I was in. It is not as tight nd has not kept me up at night. OBJECTIVE        40 min Therapeutic Exercise:  [x]  See flow sheet   Rationale:      increase ROM and increase strength to improve the patients ability to complete adls     20 min Manual Therapy: Sacral flexion, rr mobs, mfr lumbosacral junction, L sacral border, L hip ir/er stretch, iliacus and rf stretch, stm ls paraspinals, glutes, piri   Rationale:      decrease pain, increase ROM, increase tissue extensibility and decrease trigger points to improve patient's ability to complete adls         min Patient Education:  yes  Reviewed HEP   []  Progressed/Changed HEP based on:        Other Objective/Functional Measures:  1:1 8:  Significant reduction in overall muscular restrictions compared to previous session, still continues to require active warm up and prom to achieve full L hip prom   Post Treatment Pain Level (on 0 to 10) scale:   0  / 10     ASSESSMENT  Assessment/Changes in Function:   Denied any pain with ls rotation B - sharp pain reported at end range rr and during twisting adls      []  See Progress Note/Recertification   Patient will continue to benefit from skilled PT services to modify and progress therapeutic interventions, address functional mobility deficits, address ROM deficits, address strength deficits, analyze and address soft tissue restrictions, analyze and cue movement patterns, analyze and modify body mechanics/ergonomics, assess and modify postural abnormalities and instruct in home and community integration to attain remaining goals. Progress toward goals / Updated goals:   Max pain over weekend 2/10 - progress towards consistent pain reduction      PLAN  []  Upgrade activities as tolerated yes Continue plan of care   []  Discharge due to :    []  Other:      Therapist: Gail Villafana PT    Date: 7/17/2017 Time: 7:58 AM     Future Appointments  Date Time Provider Patricia Barrera   7/19/2017 1:00 PM Gail Villafana PT [facility]   7/21/2017 11:00 AM Gail Villafana PT [facility]   7/24/2017 10:30 AM Gail Villafana, PT [facility]   7/26/2017 10:30 AM Gail Villafana PT [facility]   7/28/2017 10:30 AM Gail Villafana PT [facility]   7/31/2017 2:00 PM Gail Villafana, PT [facility]

## 2017-07-19 ENCOUNTER — HOSPITAL ENCOUNTER (OUTPATIENT)
Dept: PHYSICAL THERAPY | Age: 72
Discharge: HOME | End: 2017-07-19
Payer: MEDICARE

## 2017-07-19 PROCEDURE — 97140 MANUAL THERAPY 1/> REGIONS: CPT | Performed by: PHYSICAL THERAPIST

## 2017-07-19 PROCEDURE — 97110 THERAPEUTIC EXERCISES: CPT | Performed by: PHYSICAL THERAPIST

## 2017-07-19 NOTE — PROGRESS NOTES
[facility] PHYSICAL THERAPY - DAILY TREATMENT NOTE    Patient Name: Vinod Holt        Date: 2017  : 1945   yes Patient  Verified  Visit #:     Insurance: Payor: Ulysses Bhakta / Plan: [place] MEDICARE PART A & B / Product Type: Medicare /      In time: 12:56 Out time: 2:12   Total Treatment Time: 72     Medicare Time Tracking (below)   Total Timed Codes (min): 65 1:1 Treatment Time: 65     TREATMENT AREA =  Left hip pain [M25.552]    SUBJECTIVE  Pain Level (on 0 to 10 scale):  2 / 10   Medication Changes/New allergies or changes in medical history, any new surgeries or procedures?    no  If yes, update Summary List   Subjective Functional Status/Changes:  []  No changes reported   My hip feels completely fine but the top part of my back has been bothering for the past day or so. I know that it is something I am going to have work through. OBJECTIVE        45 min Therapeutic Exercise:  [x]  See flow sheet   Rationale:      increase ROM and increase strength to improve the patients ability to complete adls     20 min Manual Therapy: Sacral flexion, rr mobs, mfr lumbosacral junction, L sacral border, L hip ir/er stretch, iliacus and rf stretch, stm ls paraspinals, glutes, piri   Rationale:      decrease pain, increase ROM, increase tissue extensibility and decrease trigger points to improve patient's ability to complete adls         min Patient Education:  yes  Reviewed HEP   []  Progressed/Changed HEP based on:        Other Objective/Functional Measures:  Discussed at length with pt the need to participate in exercise specifically stretching on days with increased muscular soreness following PT sessions vs maintaining sedentary positions to maintain muscular extensibility  Despite increase in pain pt denied any discomfort with palpation to paraspinals    Post Treatment Pain Level (on 0 to 10) scale:   0  / 10     ASSESSMENT  Assessment/Changes in Function:   Reported complete resolution of sx at end of session      []  See Progress Note/Recertification   Patient will continue to benefit from skilled PT services to modify and progress therapeutic interventions, address functional mobility deficits, address ROM deficits, address strength deficits, analyze and address soft tissue restrictions, analyze and cue movement patterns, analyze and modify body mechanics/ergonomics, assess and modify postural abnormalities and instruct in home and community integration to attain remaining goals. Progress toward goals / Updated goals:   Will perform re-cert next session in order to assess progress towards goal      PLAN  []  Upgrade activities as tolerated yes Continue plan of care   []  Discharge due to :    []  Other:      Therapist: Kamille Pham PT    Date: 7/19/2017 Time: 7:58 AM     Future Appointments  Date Time Provider Patricia Barrera   7/21/2017 11:00 AM Kamille Pham PT [facility]   7/24/2017 10:30 AM Kamille Pham PT [facility]   7/26/2017 10:30 AM Kamille Pham PT [facility]   7/28/2017 10:30 AM Kamille Pham PT [facility]   7/31/2017 2:00 PM Kamille Pham PT [facility]

## 2017-07-21 ENCOUNTER — HOSPITAL ENCOUNTER (OUTPATIENT)
Dept: PHYSICAL THERAPY | Age: 72
Discharge: HOME | End: 2017-07-21
Payer: MEDICARE

## 2017-07-21 PROCEDURE — 97110 THERAPEUTIC EXERCISES: CPT | Performed by: PHYSICAL THERAPIST

## 2017-07-21 PROCEDURE — G8979 MOBILITY GOAL STATUS: HCPCS | Performed by: PHYSICAL THERAPIST

## 2017-07-21 PROCEDURE — G8978 MOBILITY CURRENT STATUS: HCPCS | Performed by: PHYSICAL THERAPIST

## 2017-07-21 PROCEDURE — 97140 MANUAL THERAPY 1/> REGIONS: CPT | Performed by: PHYSICAL THERAPIST

## 2017-07-21 NOTE — PROGRESS NOTES
[facility] PHYSICAL THERAPY - DAILY TREATMENT NOTE    Patient Name: Cierra Gastelum        Date: 2017  : 1945   yes Patient  Verified  Visit #:   10  of   12  Insurance: Payor: Earline  / Plan: [place] MEDICARE PART A & B / Product Type: Medicare /      In time: 10:51 Out time: 11:50   Total Treatment Time: 55     Medicare Time Tracking (below)   Total Timed Codes (min): 55 1:1 Treatment Time: 55     TREATMENT AREA =  Left hip pain [M25.552]    SUBJECTIVE  Pain Level (on 0 to 10 scale):  0 / 10   Medication Changes/New allergies or changes in medical history, any new surgeries or procedures?    no  If yes, update Summary List   Subjective Functional Status/Changes:  []  No changes reported   See pn.       OBJECTIVE        40 min Therapeutic Exercise:  [x]  See flow sheet   Rationale:      increase ROM and increase strength to improve the patients ability to complete adls     14 min Manual Therapy: Sacral flexion, rr mobs, mfr lumbosacral junction, L sacral border, L hip ir/er stretch, iliacus and rf stretch, stm ls paraspinals, glutes, piri   Rationale:      decrease pain, increase ROM, increase tissue extensibility and decrease trigger points to improve patient's ability to complete adls         min Patient Education:  yes  Reviewed HEP   []  Progressed/Changed HEP based on:        Other Objective/Functional Measures:  See pn   Post Treatment Pain Level (on 0 to 10) scale:   0  / 10     ASSESSMENT  Assessment/Changes in Function:   See pn     []  See Progress Note/Recertification   Patient will continue to benefit from skilled PT services to modify and progress therapeutic interventions, address functional mobility deficits, address ROM deficits, address strength deficits, analyze and address soft tissue restrictions, analyze and cue movement patterns, analyze and modify body mechanics/ergonomics, assess and modify postural abnormalities and instruct in home and community integration to attain remaining goals.   Progress toward goals / Updated goals:  See pn     PLAN  []  Upgrade activities as tolerated yes Continue plan of care   []  Discharge due to :    []  Other:      Therapist: Rudy Lambert PT    Date: 7/21/2017 Time: 7:58 AM     Future Appointments  Date Time Provider Patricia Barrera   7/24/2017 10:30 AM Rudy Lambert, PT [facility]   7/26/2017 10:30 AM Rudy Lambert, PT [facility]   7/28/2017 10:30 AM Rudy Lambert, PT [facility]   7/31/2017 2:00 PM Rudy Lambert, PT [facility]   8/2/2017 9:00 AM Rudy Lambert, PT [facility]   8/4/2017 8:30 AM Rudy Lambert, PT [facility]   8/9/2017 9:00 AM Rudy Lambert, PT [facility]   8/11/2017 1:00 PM Rudy Lambert, PT [facility]

## 2017-07-24 ENCOUNTER — HOSPITAL ENCOUNTER (OUTPATIENT)
Dept: PHYSICAL THERAPY | Age: 72
Discharge: HOME | End: 2017-07-24
Payer: MEDICARE

## 2017-07-24 PROCEDURE — 97140 MANUAL THERAPY 1/> REGIONS: CPT | Performed by: PHYSICAL THERAPIST

## 2017-07-24 NOTE — PROGRESS NOTES
[facility] PHYSICAL THERAPY - DAILY TREATMENT NOTE    Patient Name: Aimee Teixeira        Date: 2017  : 1945   yes Patient  Verified  Visit #:    Insurance: Payor: Baljit Fraction / Plan: [place] MEDICARE PART A & B / Product Type: Medicare /      In time: 3758 Out time: 11:40   Total Treatment Time: 55     Medicare Time Tracking (below)   Total Timed Codes (min): 55 1:1 Treatment Time:15     TREATMENT AREA =  Left hip pain [M25.552]    SUBJECTIVE  Pain Level (on 0 to 10 scale):  0 / 10   Medication Changes/New allergies or changes in medical history, any new surgeries or procedures?    no  If yes, update Summary List   Subjective Functional Status/Changes:  []  No changes reported     I had a great weekend - no back or hip pain and I did walk a lot. OBJECTIVE        40 min Therapeutic Exercise:  [x]  See flow sheet   Rationale:      increase ROM and increase strength to improve the patients ability to complete adls     15 min Manual Therapy: Sacral flexion, rr mobs L hip ir/er stretch, iliacus and rf stretch, stm ls paraspinals, glutes, piri   Rationale:      decrease pain, increase ROM, increase tissue extensibility and decrease trigger points to improve patient's ability to complete adls         min Patient Education:  yes  Reviewed HEP   []  Progressed/Changed HEP based on:        Other Objective/Functional Measures:  1:1 mt only  Due to time constraint modified te  Required constant cues to avoid anterior knee translation with wall squats    Post Treatment Pain Level (on 0 to 10) scale:   0  / 10     ASSESSMENT  Assessment/Changes in Function:   Denied pain with adls for 72 hours     []  See Progress Note/Recertification   Patient will continue to benefit from skilled PT services to modify and progress therapeutic interventions, address functional mobility deficits, address ROM deficits, address strength deficits, analyze and address soft tissue restrictions, analyze and cue movement patterns, analyze and modify body mechanics/ergonomics, assess and modify postural abnormalities and instruct in home and community integration to attain remaining goals.    Progress toward goals / Updated goals:  Assess response to progress of functional strengthening goals     PLAN  []  Upgrade activities as tolerated yes Continue plan of care   []  Discharge due to :    []  Other:      Therapist: Farida Diaz PT    Date: 7/24/2017 Time: 7:58 AM     Future Appointments  Date Time Provider Patricia Barrera   7/26/2017 10:30 AM Farida Diaz PT [facility]   7/28/2017 10:30 AM Farida Diaz PT [facility]   7/31/2017 2:00 PM Farida Diaz PT [facility]   8/2/2017 9:00 AM Farida Diaz PT [facility]   8/4/2017 8:30 AM Farida Diaz PT [facility]   8/9/2017 9:00 AM Farida Diaz PT [facility]   8/11/2017 1:00 PM Farida Diaz, PT [facility]

## 2017-07-24 NOTE — PROGRESS NOTES
S[facility] PHYSICAL THERAPY   [facility] [address],[facility], [address] [facility] - Phone: [phone]  Fax: [phone] OF CARE/RECERTIFICATION FOR PHYSICAL THERAPY          Patient Name: Nicole Ross : 1155   Treatment/Medical Diagnosis: Left hip pain [M25.552]   Onset Date: chronic    Referral Source: Katarzyna Moore MD Start of Care [facility]): 17   Prior Hospitalization: See Medical History Provider #: [id number]   Prior Level of Function: Previous lbp limiting bending/lifting   Comorbidities: lbp   Medications: Verified on Patient Summary List   Visits from [facility]: 10 Missed Visits: 0     Goal/Measure of Progress Goal Met? 1. Pt will increase FOTO by 30 points in order to show functional improvement    Status at last Eval:  Current Status: 58/100 progressing   2. Pt will increase L hip arom wnl all directions to A with adls   Status at last Eval: Flex 88, er 4, ir neutral, ext 8, abd 14 Current Status: Flex 113, er 9, ir 8, ext 12, abd full progressing   3. Pt will note daily max pain </=3/10 in order to increase participation in adls   Status at last Eval: 9/10 Current Status: 3/10 yes     Key Functional Changes/Progress: pt has made excellent progress with PT thus far. She reports 1/10 max pain in posterior lateral hip and 3/10 in low back with transfers and ambulation >1/4 mile. Denies any sx distal to buttock. Her lumbar spine arom wnl all directions. Continues with decreased glute and quad strength limiting sit<>stand, stairs and community ambulation.  Would like to continue with therapy an additional month to progress strength and functional mobility  Problem List: pain affecting function, decrease ROM, decrease strength, impaired gait/ balance, decrease ADL/ functional abilitiies, decrease activity tolerance, decrease flexibility/ joint mobility and decrease transfer abilities   Treatment Plan may include any combination of the following: Therapeutic exercise, Therapeutic activities, Neuromuscular re-education, Physical agent/modality, Gait/balance training, Manual therapy, Patient education, Self Care training, Functional mobility training, Home safety training and Stair training  Patient Goal(s) has been updated and includes:      Goals for this certification period include and are to be achieved in   4  weeks:  Achieve goals above  Pt will be able to self manage sx in order to prepare for dc  Frequency / Duration:   Patient to be seen   2-3   times per week for   4    weeks:  G-Codes (GP): Ritesh Moreland .Mobility [id number] Current  CK= 40-59%   Goal  CK= 40-59%. The severity rating is based on the FOTO Score    Assessments/Recommendations: cont therapy an additional month   If you have any questions/comments please contact us directly at [phone]. Thank you for allowing us to assist in the care of your patient. Therapist Signature: Sarah Silva, PT Date: 1/29/87   Certification Period:  Reporting Period: 6/27/17-7/21/17 6/27/17-9/25/17 Time: 10:00 AM   NOTE TO PHYSICIAN:  PLEASE COMPLETE THE ORDERS BELOW AND FAX TO   [facility] Physical Therapy: [phone]  If you are unable to process this request in 24 hours please contact our office: [phone]    ___ I have read the above report and request that my patient continue as recommended.   ___ I have read the above report and request that my patient continue therapy with the following changes/special instructions: ________________________________________________   ___ I have read the above report and request that my patient be discharged from therapy.      Physician Signature:        Date:       Time:

## 2017-07-26 ENCOUNTER — HOSPITAL ENCOUNTER (OUTPATIENT)
Dept: PHYSICAL THERAPY | Age: 72
Discharge: HOME | End: 2017-07-26
Payer: MEDICARE

## 2017-07-26 PROCEDURE — 97140 MANUAL THERAPY 1/> REGIONS: CPT | Performed by: PHYSICAL THERAPIST

## 2017-07-26 PROCEDURE — 97110 THERAPEUTIC EXERCISES: CPT | Performed by: PHYSICAL THERAPIST

## 2017-07-26 NOTE — PROGRESS NOTES
[facility] PHYSICAL THERAPY - DAILY TREATMENT NOTE    Patient Name: Carlos Wood        Date: 2017  : 1945   yes Patient  Verified  Visit #:    Insurance: Payor: Fabio Ceja / Plan: [place] MEDICARE PART A & B / Product Type: Medicare /      In time:  Out time: 11:40   Total Treatment Time: 60     Medicare Time Tracking (below)   Total Timed Codes (min): 60 1:1 Treatment Time:30     TREATMENT AREA =  Left hip pain [M25.552]    SUBJECTIVE  Pain Level (on 0 to 10 scale):  0 / 10   Medication Changes/New allergies or changes in medical history, any new surgeries or procedures?    no  If yes, update Summary List   Subjective Functional Status/Changes:  []  No changes reported     I enrolled in a local gym and I am really excited to exercise but I am not sure what to do       OBJECTIVE        45 min Therapeutic Exercise:  [x]  See flow sheet   Rationale:      increase ROM and increase strength to improve the patients ability to complete adls     15 min Manual Therapy: Sacral flexion, rr mobs B hip ir/er stretch, iliacus and rf stretch, stm ls paraspinals, glutes, piri   Rationale:      decrease pain, increase ROM, increase tissue extensibility and decrease trigger points to improve patient's ability to complete adls         min Patient Education:  yes  Reviewed HEP   []  Progressed/Changed HEP based on:        Other Objective/Functional Measures:  1:1 [id number]  Good form with table te exercises - standing require cues on form for hip hinge   Post Treatment Pain Level (on 0 to 10) scale:   0  / 10     ASSESSMENT  Assessment/Changes in Function:   Chief c/o in low back is stiffness, decreased ability to sit<>Stand from low surfaces and walk prolonged periods   []  See Progress Note/Recertification   Patient will continue to benefit from skilled PT services to modify and progress therapeutic interventions, address functional mobility deficits, address ROM deficits, address strength deficits, analyze and address soft tissue restrictions, analyze and cue movement patterns, analyze and modify body mechanics/ergonomics, assess and modify postural abnormalities and instruct in home and community integration to attain remaining goals. Progress toward goals / Updated goals:   Max pain in past 4 days 0/10      PLAN  []  Upgrade activities as tolerated yes Continue plan of care   []  Discharge due to :    []  Other:      Therapist: Maricarmen Frost PT    Date: 7/26/2017 Time: 7:58 AM     Future Appointments  Date Time Provider Patricia Barrera   7/28/2017 10:30 AM Maricarmen Frost PT [facility]   7/31/2017 2:00 PM Maricarmen Frost PT [facility]   8/2/2017 9:00 AM Maricarmen Frost PT [facility]   8/4/2017 8:30 AM Maricarmen Frost, PT [facility]   8/9/2017 9:00 AM Maricarmen Frost PT [facility]   8/11/2017 1:00 PM Maricarmen Frost, PT [facility]

## 2017-07-28 ENCOUNTER — HOSPITAL ENCOUNTER (OUTPATIENT)
Dept: PHYSICAL THERAPY | Age: 72
Discharge: HOME | End: 2017-07-28
Payer: MEDICARE

## 2017-07-28 PROCEDURE — 97110 THERAPEUTIC EXERCISES: CPT | Performed by: PHYSICAL THERAPIST

## 2017-07-28 PROCEDURE — 97140 MANUAL THERAPY 1/> REGIONS: CPT | Performed by: PHYSICAL THERAPIST

## 2017-07-28 NOTE — PROGRESS NOTES
[facility] PHYSICAL THERAPY - DAILY TREATMENT NOTE    Patient Name: Travis Morales        Date: 2017  : 1945   yes Patient  Verified  Visit #:   15  of   18 Insurance: Payor: Milka Maddox / Plan: VA MEDICARE PART A & B / Product Type: Medicare /      In time: 10:30 Out time: 8685   Total Treatment Time: 65     Medicare Time Tracking (below)   Total Timed Codes (min): 65 1:1 Treatment Time:65     TREATMENT AREA =  Left hip pain [M25.552]    SUBJECTIVE  Pain Level (on 0 to 10 scale):  0 / 10   Medication Changes/New allergies or changes in medical history, any new surgeries or procedures?    no  If yes, update Summary List   Subjective Functional Status/Changes:  [x]  No changes reported            OBJECTIVE        50 min Therapeutic Exercise:  [x]  See flow sheet   Rationale:      increase ROM and increase strength to improve the patients ability to complete adls     15 min Manual Therapy: Sacral flexion, rr mobs B hip ir/er stretch, iliacus and rf stretch, stm ls paraspinals, glutes, piri   Rationale:      decrease pain, increase ROM, increase tissue extensibility and decrease trigger points to improve patient's ability to complete adls         min Patient Education:  yes  Reviewed HEP   []  Progressed/Changed HEP based on:        Other Objective/Functional Measures:  Good form with te no significant findings with stm   Post Treatment Pain Level (on 0 to 10) scale:   0  / 10     ASSESSMENT  Assessment/Changes in Function:     Continued weight bearing adls without progressing pain   []  See Progress Note/Recertification   Patient will continue to benefit from skilled PT services to modify and progress therapeutic interventions, address functional mobility deficits, address ROM deficits, address strength deficits, analyze and address soft tissue restrictions, analyze and cue movement patterns, analyze and modify body mechanics/ergonomics, assess and modify postural abnormalities and instruct in home and community integration to attain remaining goals.    Progress toward goals / Updated goals:  Steady progress towards all goals     PLAN  []  Upgrade activities as tolerated yes Continue plan of care   []  Discharge due to :    []  Other:      Therapist: Bishop Cancino, PT    Date: 7/28/2017 Time: 7:58 AM     Future Appointments  Date Time Provider Patricia Barrera   7/31/2017 2:00 PM Bishop Cancino, PT [facility]   8/2/2017 9:00 AM Bishop Cancino, PT [facility]   8/4/2017 8:30 AM Bishop Cancino, PT [facility]   8/9/2017 9:00 AM Bishop Cancino, PT [facility]   8/11/2017 1:00 PM Bishop Cancino, PT [id number] [facility]

## 2017-07-31 ENCOUNTER — HOSPITAL ENCOUNTER (OUTPATIENT)
Dept: PHYSICAL THERAPY | Age: 72
Discharge: HOME | End: 2017-07-31
Payer: MEDICARE

## 2017-07-31 PROCEDURE — 97110 THERAPEUTIC EXERCISES: CPT | Performed by: PHYSICAL THERAPIST

## 2017-07-31 PROCEDURE — 97140 MANUAL THERAPY 1/> REGIONS: CPT | Performed by: PHYSICAL THERAPIST

## 2017-07-31 NOTE — PROGRESS NOTES
[facility] PHYSICAL THERAPY - DAILY TREATMENT NOTE    Patient Name: Libra Lee        Date: 2017  : 1945   yes Patient  Verified  Visit #:   15  of   18 Insurance: Payor: Margaret Membreno / Plan: VA MEDICARE PART A & B / Product Type: Medicare /      In time: 2:00 Out time: 315   Total Treatment Time: 70     Medicare Time Tracking (below)   Total Timed Codes (min): 70 1:1 Treatment Time:35     TREATMENT AREA =  Left hip pain [M25.552]    SUBJECTIVE  Pain Level (on 0 to 10 scale):  0 / 10   Medication Changes/New allergies or changes in medical history, any new surgeries or procedures?    no  If yes, update Summary List   Subjective Functional Status/Changes:  [x]  No changes reported     I get about 700 steps (according to my fit bit) and then my low back starts to tighten up. Its not pain it just feels really stiff        OBJECTIVE        55 min Therapeutic Exercise:  [x]  See flow sheet   Rationale:      increase ROM and increase strength to improve the patients ability to complete adls     15 min Manual Therapy: Sacral flexion, rr mobs B hip ir/er stretch, iliacus and rf stretch, stm ls paraspinals, glutes, piri   Rationale:      decrease pain, increase ROM, increase tissue extensibility and decrease trigger points to improve patient's ability to complete adls         min Patient Education:  yes  Reviewed HEP   []  Progressed/Changed HEP based on:        Other Objective/Functional Measures:  1:1 200-235  Progressed hip flexibility exercises to improve linear motion of pelvis  Increased time between te requiring time constraints on te    Post Treatment Pain Level (on 0 to 10) scale:   0  / 10     ASSESSMENT  Assessment/Changes in Function:     Continued weight bearing adls without progressing pain   []  See Progress Note/Recertification   Patient will continue to benefit from skilled PT services to modify and progress therapeutic interventions, address functional mobility deficits, address ROM deficits, address strength deficits, analyze and address soft tissue restrictions, analyze and cue movement patterns, analyze and modify body mechanics/ergonomics, assess and modify postural abnormalities and instruct in home and community integration to attain remaining goals.    Progress toward goals / Updated goals:  Steady progress towards wb goal      PLAN  []  Upgrade activities as tolerated yes Continue plan of care   []  Discharge due to :    []  Other:      Therapist: Kt Wooten PT    Date: 7/31/2017 Time: 7:58 AM     Future Appointments  Date Time Provider Patricia Barrera   8/2/2017 9:00 AM Kt Wooten PT [facility]   8/4/2017 8:30 AM Kt Wooten PT [facility]   8/9/2017 9:00 AM Kt Wooten PT [facility]   8/11/2017 1:00 PM Kt Wooten PT [address] [facility]

## 2017-08-02 ENCOUNTER — HOSPITAL ENCOUNTER (OUTPATIENT)
Dept: PHYSICAL THERAPY | Age: 72
Discharge: HOME | End: 2017-08-02
Payer: MEDICARE

## 2017-08-02 PROCEDURE — 97140 MANUAL THERAPY 1/> REGIONS: CPT | Performed by: PHYSICAL THERAPIST

## 2017-08-02 PROCEDURE — 97110 THERAPEUTIC EXERCISES: CPT | Performed by: PHYSICAL THERAPIST

## 2017-08-02 NOTE — PROGRESS NOTES
[facility] PHYSICAL THERAPY - DAILY TREATMENT NOTE    Patient Name: Cierra Gastelum        Date: 2017  : 1945   yes Patient  Verified  Visit #:   15  of   18 Insurance: Payor: Earline  / Plan: VA MEDICARE PART A & B / Product Type: Medicare /      In time: 855 Out time: 5195   Total Treatment Time: 79     Medicare Time Tracking (below)   Total Timed Codes (min): 67 1:1 Treatment Time:67     TREATMENT AREA =  Left hip pain [M25.552]    SUBJECTIVE  Pain Level (on 0 to 10 scale):  0 / 10   Medication Changes/New allergies or changes in medical history, any new surgeries or procedures?    no  If yes, update Summary List   Subjective Functional Status/Changes:  [x]  No changes reported     The new stretching exercises really seemed to help out       OBJECTIVE        55 min Therapeutic Exercise:  [x]  See flow sheet   Rationale:      increase ROM and increase strength to improve the patients ability to complete adls     12 min Manual Therapy: Sacral flexion, rr mobs B hip ir/er stretch, iliacus and rf stretch, stm ls paraspinals, glutes, piri   Rationale:      decrease pain, increase ROM, increase tissue extensibility and decrease trigger points to improve patient's ability to complete adls         min Patient Education:  yes  Reviewed HEP   []  Progressed/Changed HEP based on:        Other Objective/Functional Measures:  Denied any pain with te or mt  Good form with all table exercises   Post Treatment Pain Level (on 0 to 10) scale:   0  / 10     ASSESSMENT  Assessment/Changes in Function:     Slow progression of walking duration without pain    []  See Progress Note/Recertification   Patient will continue to benefit from skilled PT services to modify and progress therapeutic interventions, address functional mobility deficits, address ROM deficits, address strength deficits, analyze and address soft tissue restrictions, analyze and cue movement patterns, analyze and modify body mechanics/ergonomics, assess and modify postural abnormalities and instruct in home and community integration to attain remaining goals.    Progress toward goals / Updated goals:  Steady progress towards all goals      PLAN  []  Upgrade activities as tolerated yes Continue plan of care   []  Discharge due to :    []  Other:      Therapist: Mehdi Kenny PT    Date: 8/2/2017 Time: 7:58 AM     Future Appointments  Date Time Provider Patricia Barrera   8/9/2017 9:00 AM Mehdi Kenny PT [facility]   8/11/2017 1:00 PM Mehdi Kenny PT [facility]   8/14/2017 2:00 PM Mehdi Kenny PT [facility]

## 2017-08-04 ENCOUNTER — APPOINTMENT (OUTPATIENT)
Dept: PHYSICAL THERAPY | Age: 72
End: 2017-08-04
Payer: MEDICARE

## 2017-08-09 ENCOUNTER — HOSPITAL ENCOUNTER (OUTPATIENT)
Dept: PHYSICAL THERAPY | Age: 72
Discharge: HOME | End: 2017-08-09
Payer: MEDICARE

## 2017-08-09 PROCEDURE — 97140 MANUAL THERAPY 1/> REGIONS: CPT | Performed by: PHYSICAL THERAPIST

## 2017-08-09 PROCEDURE — G8979 MOBILITY GOAL STATUS: HCPCS | Performed by: PHYSICAL THERAPIST

## 2017-08-09 PROCEDURE — G8980 MOBILITY D/C STATUS: HCPCS | Performed by: PHYSICAL THERAPIST

## 2017-08-09 NOTE — PROGRESS NOTES
[facility] - DAILY TREATMENT NOTE    Patient Name: Christi Duque        Date: 2017  : 1945   yes Patient  Verified  Visit #:    Insurance: Payor: Velton Fend / Plan: [place] / Product Type: Medicare /      In time: 900 Out time: 1003   Total Treatment Time: 60     Medicare Time Tracking (below)   Total Timed Codes (min): 60 1:1 Treatment Time:10     TREATMENT AREA =  Left hip pain [M25.552]    SUBJECTIVE  Pain Level (on 0 to 10 scale):  0 / 10   Medication Changes/New allergies or changes in medical history, any new surgeries or procedures?    no  If yes, update Summary List   Subjective Functional Status/Changes:  [x]  No changes reported     The new stretching exercises really seemed to help out       OBJECTIVE        50 min Therapeutic Exercise:  [x]  See flow sheet   Rationale:      increase ROM and increase strength to improve the patients ability to complete adls     10 min Manual Therapy: Sacral flexion, rr mobs B hip ir/er stretch, iliacus and rf stretch, stm ls paraspinals, glutes, piri   Rationale:      decrease pain, increase ROM, increase tissue extensibility and decrease trigger points to improve patient's ability to complete adls         min Patient Education:  yes  Reviewed HEP   []  Progressed/Changed HEP based on:        Other Objective/Functional Measures:  1:1 mt only  Completed te as per flow sheet without c/o    Post Treatment Pain Level (on 0 to 10) scale:   0  / 10     ASSESSMENT  Assessment/Changes in Function:     Minimal soft tissue restrictions and hip prom    []  See Progress Note/Recertification   Patient will continue to benefit from skilled PT services to modify and progress therapeutic interventions, address functional mobility deficits, address ROM deficits, address strength deficits, analyze and address soft tissue restrictions, analyze and cue movement patterns, analyze and modify body mechanics/ergonomics, assess and modify postural abnormalities and instruct in home and community integration to attain remaining goals.    Progress toward goals / Updated goals:   pt attended local rec 2x since last visit but required PT education on modification to exercises perform - progress towards ltg      PLAN  []  Upgrade activities as tolerated yes Continue plan of care   []  Discharge due to :    []  Other:      Therapist: Manjit Lao PT    Date: 8/9/2017 Time: 7:58 AM     Future Appointments  Date Time Provider Patricia Barrera   8/9/2017 9:00 AM Manjit Lao, [facility]   8/11/2017 1:00 PM Manjit Lao, PT [facility]   8/14/2017 2:00 PM Manjit Lao, [facility]

## 2017-08-14 ENCOUNTER — APPOINTMENT (OUTPATIENT)
Dept: PHYSICAL THERAPY | Age: 72
End: 2017-08-14
Payer: MEDICARE

## 2017-09-01 NOTE — PROGRESS NOTES
[facility] PHYSICAL THERAPY  [address] [place] [address], [place] [address],[facility], [facility] - Phone: [phone]  Fax: [phone]  Hector Gideonleah Dia          Patient Name: Travis Morales : 1985   Treatment/Medical Diagnosis: Left hip pain [M25.552]   Onset Date: chronic    Referral Source: Leon Pulido MD Start of Care [facility]): 17   Prior Hospitalization: See Medical History Provider #: [id number]   Prior Level of Function: Previous lbp limiting bending/lifting   Comorbidities: lbp   Medications: Verified on Patient Summary List   Visits from [facility]: 16 Missed Visits: 2     Goal/Measure of Progress Goal Met? 1. Pt will increase FOTO by 30 points in order to show functional improvement    Status at last Eval: 58/100 Current Status: See below n/a   2. Pt will increase L hip arom wnl all directions to A with adls   Status at last Eval: Flex 113, er 9, ir 8, ext 12, abd full Current Status:  yes   3. Pt will be able to self manage sx in order to prepare for dc   Status at last Eval: n/a Current Status:  yes     Key Functional Changes/Progress: pt called following her last appointment stating she was able to self manage sx. She reported 0/10 max pain with all adls and enrolled in a local rec center. She did not fill out FOTO so unable to reassess ltg #2    G-Codes (GP): . Mobility   Goal  CK= 40-59%  D/C  CK= 40-59%. The severity rating is based on the FOTO Score    Assessments/Recommendations: Discontinue therapy. Progressing towards or have reached established goals. If you have any questions/comments please contact us directly at [phone]. Thank you for allowing us to assist in the care of your patient.     Therapist Signature: Katie Hutson, PT Date: 17   Reporting Period: 17-17 Time: 8:10 AM

## 2017-09-19 DIAGNOSIS — G25.81 RLS (RESTLESS LEGS SYNDROME): ICD-10-CM

## 2017-09-19 DIAGNOSIS — K21.9 GASTROESOPHAGEAL REFLUX DISEASE, ESOPHAGITIS PRESENCE NOT SPECIFIED: ICD-10-CM

## 2017-09-19 RX ORDER — OMEPRAZOLE 20 MG/1
CAPSULE, DELAYED RELEASE ORAL
Qty: 30 CAPSULE | Refills: 0 | Status: SHIPPED | OUTPATIENT
Start: 2017-09-19

## 2017-09-19 RX ORDER — GABAPENTIN 100 MG/1
CAPSULE ORAL
Qty: 30 CAPSULE | Refills: 0 | Status: SHIPPED | OUTPATIENT
Start: 2017-09-19

## 2023-10-06 ENCOUNTER — ANESTHESIA (OUTPATIENT)
Dept: ENDOSCOPY | Age: 78
End: 2023-10-06
Payer: MEDICARE

## 2023-10-06 ENCOUNTER — HOSPITAL ENCOUNTER (OUTPATIENT)
Age: 78
Setting detail: OUTPATIENT SURGERY
Discharge: HOME | End: 2023-10-06
Attending: INTERNAL MEDICINE | Admitting: INTERNAL MEDICINE
Payer: MEDICARE

## 2023-10-06 ENCOUNTER — ANESTHESIA EVENT (OUTPATIENT)
Dept: ENDOSCOPY | Age: 78
End: 2023-10-06
Payer: MEDICARE

## 2023-10-06 VITALS
HEART RATE: 68 BPM | WEIGHT: 197.7 LBS | BODY MASS INDEX: 32.94 KG/M2 | HEIGHT: 65 IN | DIASTOLIC BLOOD PRESSURE: 83 MMHG | SYSTOLIC BLOOD PRESSURE: 106 MMHG | OXYGEN SATURATION: 96 % | RESPIRATION RATE: 16 BRPM | TEMPERATURE: 96.7 F

## 2023-10-06 DIAGNOSIS — K52.9 CHRONIC DIARRHEA: ICD-10-CM

## 2023-10-06 PROCEDURE — 3700000000 HC ANESTHESIA ATTENDED CARE: Performed by: INTERNAL MEDICINE

## 2023-10-06 PROCEDURE — 2580000003 HC RX 258: Performed by: FAMILY MEDICINE

## 2023-10-06 PROCEDURE — 88305 TISSUE EXAM BY PATHOLOGIST: CPT

## 2023-10-06 PROCEDURE — 6360000002 HC RX W HCPCS: Performed by: NURSE ANESTHETIST, CERTIFIED REGISTERED

## 2023-10-06 PROCEDURE — 7100000011 HC PHASE II RECOVERY - ADDTL 15 MIN: Performed by: INTERNAL MEDICINE

## 2023-10-06 PROCEDURE — 3700000001 HC ADD 15 MINUTES (ANESTHESIA): Performed by: INTERNAL MEDICINE

## 2023-10-06 PROCEDURE — 3609010600 HC COLONOSCOPY POLYPECTOMY SNARE/COLD BIOPSY: Performed by: INTERNAL MEDICINE

## 2023-10-06 PROCEDURE — 2709999900 HC NON-CHARGEABLE SUPPLY: Performed by: INTERNAL MEDICINE

## 2023-10-06 PROCEDURE — 7100000010 HC PHASE II RECOVERY - FIRST 15 MIN: Performed by: INTERNAL MEDICINE

## 2023-10-06 RX ORDER — VALSARTAN 80 MG/1
80 TABLET ORAL DAILY
COMMUNITY
Start: 2023-02-14

## 2023-10-06 RX ORDER — PROPOFOL 10 MG/ML
INJECTION, EMULSION INTRAVENOUS PRN
Status: DISCONTINUED | OUTPATIENT
Start: 2023-10-06 | End: 2023-10-06 | Stop reason: SDUPTHER

## 2023-10-06 RX ORDER — PROPOFOL 10 MG/ML
INJECTION, EMULSION INTRAVENOUS CONTINUOUS PRN
Status: DISCONTINUED | OUTPATIENT
Start: 2023-10-06 | End: 2023-10-06 | Stop reason: SDUPTHER

## 2023-10-06 RX ORDER — LIDOCAINE HYDROCHLORIDE 20 MG/ML
INJECTION, SOLUTION INTRAVENOUS PRN
Status: DISCONTINUED | OUTPATIENT
Start: 2023-10-06 | End: 2023-10-06 | Stop reason: SDUPTHER

## 2023-10-06 RX ORDER — UBIDECARENONE 100 MG
CAPSULE ORAL DAILY
COMMUNITY
Start: 2019-02-13

## 2023-10-06 RX ORDER — PRAVASTATIN SODIUM 20 MG
20 TABLET ORAL DAILY
COMMUNITY
Start: 2020-07-27

## 2023-10-06 RX ORDER — SODIUM CHLORIDE, SODIUM LACTATE, POTASSIUM CHLORIDE, CALCIUM CHLORIDE 600; 310; 30; 20 MG/100ML; MG/100ML; MG/100ML; MG/100ML
INJECTION, SOLUTION INTRAVENOUS CONTINUOUS
Status: DISCONTINUED | OUTPATIENT
Start: 2023-10-06 | End: 2023-10-06 | Stop reason: HOSPADM

## 2023-10-06 RX ADMIN — PROPOFOL 20 MG: 10 INJECTION, EMULSION INTRAVENOUS at 08:49

## 2023-10-06 RX ADMIN — PROPOFOL 20 MG: 10 INJECTION, EMULSION INTRAVENOUS at 08:47

## 2023-10-06 RX ADMIN — PROPOFOL 50 MCG/KG/MIN: 10 INJECTION, EMULSION INTRAVENOUS at 08:42

## 2023-10-06 RX ADMIN — SODIUM CHLORIDE, POTASSIUM CHLORIDE, SODIUM LACTATE AND CALCIUM CHLORIDE: 600; 310; 30; 20 INJECTION, SOLUTION INTRAVENOUS at 07:41

## 2023-10-06 RX ADMIN — PROPOFOL 30 MG: 10 INJECTION, EMULSION INTRAVENOUS at 08:42

## 2023-10-06 RX ADMIN — LIDOCAINE HYDROCHLORIDE 50 MG: 20 INJECTION, SOLUTION INTRAVENOUS at 08:42

## 2023-10-06 RX ADMIN — PROPOFOL 20 MG: 10 INJECTION, EMULSION INTRAVENOUS at 08:45

## 2023-10-06 ASSESSMENT — PAIN DESCRIPTION - DESCRIPTORS: DESCRIPTORS: CRAMPING

## 2023-10-06 ASSESSMENT — PAIN SCALES - GENERAL
PAINLEVEL_OUTOF10: 0
PAINLEVEL_OUTOF10: 0

## 2023-10-06 ASSESSMENT — PAIN - FUNCTIONAL ASSESSMENT: PAIN_FUNCTIONAL_ASSESSMENT: 0-10

## 2023-10-06 NOTE — ANESTHESIA POSTPROCEDURE EVALUATION
Department of Anesthesiology  Postprocedure Note    Patient: Melony Duque  MRN: [id number]  YOB: 1945  Date of evaluation: 10/6/2023      Procedure Summary     Date: 10/06/23 Room / Location: [address] [place] [address]    Anesthesia Start: 5738 Anesthesia Stop: 5924    Procedure: COLONOSCOPY POLYPECTOMY SNARE/COLD BIOPSY COLONOSCOPY WITH BIOPSY Diagnosis:       Chronic diarrhea      (Chronic diarrhea [K52.9])    Surgeons: Checo Winter MD Responsible Provider: Luzmaria Rodriguez MD    Anesthesia Type: MAC ASA Status: 3          Anesthesia Type: No value filed.     Arnel Phase I: Arnel Score: 10    Arnel Phase II: Arnel Score: 10      Anesthesia Post Evaluation    Patient location during evaluation: PACU  Level of consciousness: awake  Complications: no  Multimodal analgesia pain management approach

## 2023-10-06 NOTE — H&P
[facility]  Outpatient Procedure H&P    Patient: Nel Yadav MRN: [id number]     YOB: 1945  Age: 77 y.o. Sex: female    Unit: [facility] Room/Bed: Endo Pool/NONE Location: [address] [place] [address]     Procedure: Procedure(s):  COLONOSCOPY    Indication: Pre-Op Diagnosis Codes:     * Chronic diarrhea [K52.9]    Referring  Physician:          Nurses past medical history notes reviewed and agreed. Medications reviewed. Allergies: Patient has no known allergies.      Allergies noted: Yes     Past Medical History:   Past Medical History:   Diagnosis Date    Chicken pox     Degenerative joint disease involving multiple joints on both sides of body 8/15/2016    Depression     GERD (gastroesophageal reflux disease)     Hyperlipidemia     Neuropathy     Obesity     Sinusitis     Unspecified sleep apnea        Past Surgical History:   Past Surgical History:   Procedure Laterality Date    HYSTERECTOMY      SALPINGO-OOPHORECTOMY      Bilateral    EDY AND BSO      TONSILLECTOMY AND ADENOIDECTOMY      WISDOM TOOTH EXTRACTION         Social History:   Social History     Socioeconomic History    Marital status:      Spouse name: Not on file    Number of children: Not on file    Years of education: Not on file    Highest education level: Not on file   Occupational History    Not on file   Tobacco Use    Smoking status: Never    Smokeless tobacco: Never   Substance and Sexual Activity    Alcohol use: Yes     Comment: <1 drink/day    Drug use: No    Sexual activity: Not on file   Other Topics Concern    Not on file   Social History Narrative    Not on file     Social Determinants of Health     Financial Resource Strain: Not on file   Food Insecurity: Not on file   Transportation Needs: Not on file   Physical Activity: Not on file   Stress: Not on file   Social Connections: Not on file   Intimate Partner Violence: Not on file   Housing Stability: Not on file       Family History:   Family

## 2023-10-06 NOTE — ANESTHESIA PRE PROCEDURE
Plan      MAC     ASA 3       Induction: intravenous. Anesthetic plan and risks discussed with patient. Use of blood products discussed with patient whom. Plan discussed with CRNA.                     Alisha Meng MD   10/6/2023

## 2023-10-06 NOTE — OP NOTE
Colonoscopy Procedure Note    Patient: Dyan Ventura MRN: [id number]     YOB: 1945  Age: 77 y.o. Sex: female    Unit: [facility] ENDOSCOPY Room/Bed: Endo Pool/NONE Location: [address] [place] [address]       Colonoscopy with biopsy, polypectomy (cold snare)    Admitting Physician: Harvinder Govea     Primary Care Physician: Angeli Varma MD      Preoperative Diagnosis: Pre-Op Diagnosis Codes:     * Chronic diarrhea [K52.9]      DATE OF OPERATION: 10/6/2023    OPERATIVE SURGEON: Harvinder Govea MD      ANESTHESIA: Monitor Anesthesia Care      Procedure Details:    After informed consent was obtained with all risks and benefits of procedure explained and preoperative exam completed, the patient was taken to the endoscopy suite and placed in the left lateral decubitus position. Upon sequential sedation as per above, a digital rectal exam was performed and was normal.  The Olympus videocolonoscope  was inserted in the rectum and carefully advanced to the terminal ileum. Cecum Intubated Yes. The quality of preparation was good. The colonoscope was slowly withdrawn with careful evaluation between folds. Retroflexion in the rectum was performed. Estimated Blood Loss: minimal    Complications:  none    Findings:   Normal terminal ileum  diverticulosis,  Moderate in degree, involving the sigmoid  hemorrhoids internal and external, Small in size  polyp(s) #1, 4 mm in size, located in the ascending colon removed by cold snare and retrieved for pathology  Normal colonic mucosa status post random colonic biopsies for microscopic colitis    Plan: Await pathology results.         Signed By: Harvinder Govea MD

## 2023-10-06 NOTE — PROGRESS NOTES
Ambulatory Surgery/Procedure Discharge Note    Vitals:    10/06/23 0929   BP: 106/83   Pulse: 68   Resp: 16   Temp:    SpO2: 96%       In: 200 [I.V.:200]  Out: -     Restroom use offered before discharge. Yes    Pain assessment:  level of pain (1-10, 10 severe)  Pain Level: 0  Pt to Endoscopy recovery post Colonoscopy. Pt denies pain at this time. Pt denies nausea at this time, pt tolerating PO fluids well. Discharge instructions given to pt's daughter and she states understanding of these instructions. Pt and pt's daughter state that pt is \"ready to go. \"         Patient discharged to home/self care.  Patient discharged via wheel chair by transporter to waiting family/S.O.       10/6/2023 9:40 AM

## (undated) DEVICE — TRAP SPEC RETRV CLR PLAS POLYP IN LN SUCT QUIK CTCH

## (undated) DEVICE — SNARE COLD DIAMOND 10MM THIN

## (undated) DEVICE — FORCEPS BX L240CM JAW DIA2.8MM L CAP W/ NDL MIC MESH TOOTH